# Patient Record
(demographics unavailable — no encounter records)

---

## 2025-03-17 NOTE — PHYSICAL EXAM
[Appropriately responsive] : appropriately responsive [Alert] : alert [No Acute Distress] : no acute distress [No Lymphadenopathy] : no lymphadenopathy [Regular Rate Rhythm] : regular rate rhythm [No Murmurs] : no murmurs [Clear to Auscultation B/L] : clear to auscultation bilaterally [Soft] : soft [Non-tender] : non-tender [Non-distended] : non-distended [No HSM] : No HSM [No Lesions] : no lesions [No Mass] : no mass [Oriented x3] : oriented x3 [FreeTextEntry1] : Normal, no lesions [FreeTextEntry2] : Normal, no lesions [FreeTextEntry4] : Normal, no lesions seen or palpated.  Small amount of white discharge in vault [FreeTextEntry5] : Smooth, pink, no lesions.  No cervical motion tenderness [FreeTextEntry6] : Anteverted, small, mobile, nontender.  No adnexal masses or tenderness palpated on exam.

## 2025-03-17 NOTE — HISTORY OF PRESENT ILLNESS
[Patient reported PAP Smear was normal] : Patient reported PAP Smear was normal [Y] : Positive pregnancy history [Currently Active] : currently active [Men] : men [No] : No [TextBox_4] : Past obstetrical history: 10/4/2023-  section for breech presentation, female infant, 6 pounds 7 ounces, no complications [PapSmeardate] : 11/24 [TextBox_31] : Negative [LMPDate] : 03/10/25 [PGxTotal] : 1 [[facility]] : 1 [[facility] Center[facility]] : 1 [FreeTextEntry1] : 1  SECTION [TextBox_28] : History of PCOS.  Menses only come every 50 to 55 days.  Previous gynecologist would perform pelvic ultrasound annually to check status of her polycystic ovaries [FreeTextEntry3] : Is trying to conceive

## 2025-03-17 NOTE — PLAN
[FreeTextEntry1] : Explained to patient I will have her return for a pelvic ultrasound to be sure no large cysts that led to the pain that she had on March 9, but reassured her I do not feel any pathology on exam today. Then I encourage patient to go forward with meeting with reproductive endocrinologist again to consider starting medications to help promote ovulation. Patient is already on prenatal vitamins and other supplements to help promote pregnancy. She will follow-up with positive pregnancy test or when due for annual in November.